# Patient Record
Sex: MALE | Race: WHITE | URBAN - METROPOLITAN AREA
[De-identification: names, ages, dates, MRNs, and addresses within clinical notes are randomized per-mention and may not be internally consistent; named-entity substitution may affect disease eponyms.]

---

## 2022-08-05 ENCOUNTER — OFFICE VISIT (OUTPATIENT)
Dept: URBAN - METROPOLITAN AREA CLINIC 54 | Facility: CLINIC | Age: 47
End: 2022-08-05

## 2023-05-30 ENCOUNTER — LAB OUTSIDE AN ENCOUNTER (OUTPATIENT)
Dept: URBAN - METROPOLITAN AREA CLINIC 54 | Facility: CLINIC | Age: 48
End: 2023-05-30

## 2023-05-30 ENCOUNTER — WEB ENCOUNTER (OUTPATIENT)
Dept: URBAN - METROPOLITAN AREA CLINIC 54 | Facility: CLINIC | Age: 48
End: 2023-05-30

## 2023-05-30 ENCOUNTER — OFFICE VISIT (OUTPATIENT)
Dept: URBAN - METROPOLITAN AREA CLINIC 54 | Facility: CLINIC | Age: 48
End: 2023-05-30
Payer: COMMERCIAL

## 2023-05-30 ENCOUNTER — DASHBOARD ENCOUNTERS (OUTPATIENT)
Age: 48
End: 2023-05-30

## 2023-05-30 VITALS
DIASTOLIC BLOOD PRESSURE: 76 MMHG | HEART RATE: 71 BPM | HEIGHT: 72 IN | SYSTOLIC BLOOD PRESSURE: 149 MMHG | BODY MASS INDEX: 30.02 KG/M2 | WEIGHT: 221.6 LBS | TEMPERATURE: 97.5 F

## 2023-05-30 DIAGNOSIS — R14.0 ABDOMINAL BLOATING: ICD-10-CM

## 2023-05-30 DIAGNOSIS — R20.8 RECTAL BURNING: ICD-10-CM

## 2023-05-30 DIAGNOSIS — Z12.11 COLON CANCER SCREENING: ICD-10-CM

## 2023-05-30 PROBLEM — 119523007: Status: ACTIVE | Noted: 2023-05-30

## 2023-05-30 PROCEDURE — 99203 OFFICE O/P NEW LOW 30 MIN: CPT | Performed by: STUDENT IN AN ORGANIZED HEALTH CARE EDUCATION/TRAINING PROGRAM

## 2023-05-30 RX ORDER — SILDENAFIL CITRATE 50 MG/1
1 TABLET AS NEEDED TABLET, FILM COATED ORAL ONCE A DAY
Status: ON HOLD | COMMUNITY

## 2023-05-30 RX ORDER — POLYETHYLENE GLYCOL-3350 AND ELECTROLYTES WITH FLAVOR PACK 240; 5.84; 2.98; 6.72; 22.72 G/278.26G; G/278.26G; G/278.26G; G/278.26G; G/278.26G
AS DIRECTED POWDER, FOR SOLUTION ORAL AS DIRECTED
Qty: 1 PACKAGE | Refills: 0 | OUTPATIENT
Start: 2023-05-30 | End: 2023-06-01

## 2023-05-30 NOTE — HPI-TODAY'S VISIT:
Mr. Benny Berger is a 47-year-old man with a history of sleep apnea and class I obesity and heart murmur referred by Dr. Jeremy Allen for colon cancer screening.  Per PCP outpatient visit note a 2/6 to 3/6 systolic heart murmur was auscultated.  There are plans to pursue a trans thoracic echo and obtain ECG for further evaluation.  Per patient, ECG was normal.  He is getting a TTE in June.  He does not currently see cardiologist  Patient does say he has a tinge of pink/red on the toilet paper when he wipes after a bowel movement. Patient also states he experiences some rectal burning at times when he is showering.  Patient denies change in bowel habits, diarrhea, constipation, bloody stools, melena, abdominal pain, unintentional weight loss, history of colon polyps, family history of colorectal cancer.  Lastly, patient mentions that he has been having bloating after eating.  This is improved after making diet changes (stopping soda and stopping heavy meals).  This now occurs less than twice a week.

## 2023-12-06 ENCOUNTER — OFFICE VISIT (OUTPATIENT)
Dept: URBAN - METROPOLITAN AREA SURGERY CENTER 14 | Facility: SURGERY CENTER | Age: 48
End: 2023-12-06
Payer: COMMERCIAL

## 2023-12-06 DIAGNOSIS — Z12.11 COLON CANCER SCREENING: ICD-10-CM

## 2023-12-06 DIAGNOSIS — K64.9 HEMORRHOIDS: ICD-10-CM

## 2023-12-06 PROCEDURE — 45378 DIAGNOSTIC COLONOSCOPY: CPT | Performed by: STUDENT IN AN ORGANIZED HEALTH CARE EDUCATION/TRAINING PROGRAM

## 2023-12-06 PROCEDURE — G8907 PT DOC NO EVENTS ON DISCHARG: HCPCS | Performed by: STUDENT IN AN ORGANIZED HEALTH CARE EDUCATION/TRAINING PROGRAM

## 2023-12-06 PROCEDURE — 00812 ANES LWR INTST SCR COLSC: CPT

## 2023-12-06 RX ORDER — SILDENAFIL CITRATE 50 MG/1
1 TABLET AS NEEDED TABLET, FILM COATED ORAL ONCE A DAY
Status: ON HOLD | COMMUNITY

## 2024-08-01 ENCOUNTER — OFFICE VISIT (OUTPATIENT)
Dept: URBAN - METROPOLITAN AREA CLINIC 54 | Facility: CLINIC | Age: 49
End: 2024-08-01
Payer: COMMERCIAL

## 2024-08-01 ENCOUNTER — LAB OUTSIDE AN ENCOUNTER (OUTPATIENT)
Dept: URBAN - METROPOLITAN AREA CLINIC 54 | Facility: CLINIC | Age: 49
End: 2024-08-01

## 2024-08-01 VITALS
HEIGHT: 72 IN | TEMPERATURE: 98.2 F | BODY MASS INDEX: 31.15 KG/M2 | SYSTOLIC BLOOD PRESSURE: 141 MMHG | DIASTOLIC BLOOD PRESSURE: 90 MMHG | WEIGHT: 230 LBS | HEART RATE: 61 BPM

## 2024-08-01 DIAGNOSIS — R10.13 DYSPEPSIA: ICD-10-CM

## 2024-08-01 PROCEDURE — 99214 OFFICE O/P EST MOD 30 MIN: CPT

## 2024-08-01 PROCEDURE — 99244 OFF/OP CNSLTJ NEW/EST MOD 40: CPT

## 2024-08-01 NOTE — HPI-TODAY'S VISIT:
Mr. Benny Berger is a 47-year-old man with a history of sleep apnea and class I obesity and heart murmur referred by Dr. Jeremy Allen for colon cancer screening.  Per PCP outpatient visit note a 2/6 to 3/6 systolic heart murmur was auscultated.  There are plans to pursue a trans thoracic echo and obtain ECG for further evaluation.  Per patient, ECG was normal.  He is getting a TTE in June.  He does not currently see cardiologist  Patient does say he has a tinge of pink/red on the toilet paper when he wipes after a bowel movement. Patient also states he experiences some rectal burning at times when he is showering.  Patient denies change in bowel habits, diarrhea, constipation, bloody stools, melena, abdominal pain, unintentional weight loss, history of colon polyps, family history of colorectal cancer.  Lastly, patient mentions that he has been having bloating after eating.  This is improved after making diet changes (stopping soda and stopping heavy meals).  This now occurs less than twice a week.   8/1/24: Patient was referred by Dr. Derek Allen for dyspepsia. A copy of this document will be sent to the provider. Pt complains of epigastric bloating x 4 months. Worse after eating but can happen on an empty stomach. Associated with prolonged fullness and early satiety. Sometimes a little burning but not much. Mild heartburn, no reflux. No nausea/vomiting. Denies unintentional weight loss. Tried omeprazole 20mg QD x 8 weeks without any relief so he recently discontinued. Denies frequent NSAIDs. Drinks etoh 2-3 days a week, less than 14 drinks/week. No smoking. No prior EGD. Cscope was normal last year.

## 2025-02-25 ENCOUNTER — TELEPHONE ENCOUNTER (OUTPATIENT)
Dept: URBAN - METROPOLITAN AREA CLINIC 54 | Facility: CLINIC | Age: 50
End: 2025-02-25